# Patient Record
Sex: MALE | Race: WHITE | NOT HISPANIC OR LATINO
[De-identification: names, ages, dates, MRNs, and addresses within clinical notes are randomized per-mention and may not be internally consistent; named-entity substitution may affect disease eponyms.]

---

## 2021-06-27 PROBLEM — Z00.00 ENCOUNTER FOR PREVENTIVE HEALTH EXAMINATION: Status: ACTIVE | Noted: 2021-06-27

## 2021-07-01 ENCOUNTER — APPOINTMENT (OUTPATIENT)
Dept: NEUROSURGERY | Facility: CLINIC | Age: 26
End: 2021-07-01
Payer: COMMERCIAL

## 2021-07-01 VITALS
SYSTOLIC BLOOD PRESSURE: 104 MMHG | DIASTOLIC BLOOD PRESSURE: 70 MMHG | OXYGEN SATURATION: 98 % | RESPIRATION RATE: 12 BRPM | HEART RATE: 63 BPM | WEIGHT: 145 LBS | HEIGHT: 70 IN | TEMPERATURE: 97.3 F | BODY MASS INDEX: 20.76 KG/M2

## 2021-07-01 DIAGNOSIS — D33.3 BENIGN NEOPLASM OF CRANIAL NERVES: ICD-10-CM

## 2021-07-01 DIAGNOSIS — Z72.0 TOBACCO USE: ICD-10-CM

## 2021-07-01 DIAGNOSIS — G43.909 MIGRAINE, UNSPECIFIED, NOT INTRACTABLE, W/OUT STATUS MIGRAINOSUS: ICD-10-CM

## 2021-07-01 DIAGNOSIS — F41.9 ANXIETY DISORDER, UNSPECIFIED: ICD-10-CM

## 2021-07-01 PROCEDURE — 99204 OFFICE O/P NEW MOD 45 MIN: CPT

## 2021-07-01 PROCEDURE — 99072 ADDL SUPL MATRL&STAF TM PHE: CPT

## 2021-07-01 PROCEDURE — 99203 OFFICE O/P NEW LOW 30 MIN: CPT

## 2021-07-01 NOTE — ASSESSMENT
[FreeTextEntry1] : MRI brain with and without contrast from 5/24/21 reviewed by Dr. Severino with patient.\par Discussed various treatment options which include surgical resection, Gamma Knife Radiosurgery and continued imaging surveillance. Risks, benefits and alternatives to surgery were discussed in detail with patient and patient's family.\par Risks, benefits and alternatives to surgical resection discussed in detail. Risks include but are not limited to hearing loss, facial paralysis, bleeding, infection. \par Risks, alternatives and benefits to Gamma Knife Radiosurgery discussed. Risks include but are not limited to cerebral edema, radionecrosis, seizures, continued tumor growth. Patient understands and wishes to proceed.\par \par Explained procedure to patient in detail including head frame placement, new MRI the day of procedure, radiation treatment, and post-procedure care. \par Radiation treatment will take place at [address] [place] [address]. Directions and contact information provided to patient.\par Education packet regarding Gamma Knife Radiosurgery provided.\par Multiple questions answered to patient's satisfaction.\par \par All questions answered to patient and patient's satisfaction.\par \par Patient and patient's family verbalize agreement and understanding with plan of care.\par \par I, Dr. Severino, personally performed the evaluation and management (E/M) services for this new patient.  That E/M includes conducting the initial examination, assessing all conditions, and establishing the plan of care.  Today, my ACP, Melissa Garcia, was here to observe my evaluation and management services for this patient to be followed going forward.\par \par \par \par \par

## 2021-07-01 NOTE — REASON FOR VISIT
[Consultation] : a consultation visit [Referred By: _________] : Patient was referred by MK [FreeTextEntry1] : Right Acoustic Neuroma

## 2021-07-01 NOTE — HISTORY OF PRESENT ILLNESS
[de-identified] : 25 year old man with past medical history migraines and anxiety referred by Dr. Rg Montes for neurosurgical evaluation of acoustic neuroma.\par \par The patient is here with his mother for the visit.\par \par He states he has suffered from bilateral tinnitus (RIGHT greater than LEFT) for several years. About two months ago, he developed right sided headaches, most severe in the RIGHT temple region. He underwent MRI Brain with and without contrast which demonstrated a 2.3 cm CPA mass, consistent with acoustic neuroma.\par \par He saw ENT and had audiology testing done which was normal but did demonstrate worse hearing loss on the RIGHT.\par \par Denies facial weakness. Denies numbness/tingling.\par \par Reports occasional dizziness, vertigo and imbalance. Denies falls.

## 2021-07-01 NOTE — DATA REVIEWED
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

## 2021-07-01 NOTE — REASON FOR VISIT
[New Patient Visit] : a new patient visit [Referred By: _________] : Patient was referred by MK [Parent] : parent

## 2021-07-01 NOTE — PHYSICAL EXAM
[General Appearance - Alert] : alert [General Appearance - In No Acute Distress] : in no acute distress [Oriented To Time, Place, And Person] : oriented to person, place, and time [Cranial Nerves Optic (II)] : visual acuity intact bilaterally,  pupils equal round and reactive to light [Cranial Nerves Oculomotor (III)] : extraocular motion intact [Cranial Nerves Trigeminal (V)] : facial sensation intact symmetrically [Cranial Nerves Facial (VII)] : face symmetrical [Cranial Nerves Vestibulocochlear (VIII)] : hearing was intact bilaterally [Cranial Nerves Glossopharyngeal (IX)] : tongue and palate midline [Cranial Nerves Hypoglossal (XII)] : there was no tongue deviation with protrusion [Cranial Nerves Accessory (XI - Cranial And Spinal)] : head turning and shoulder shrug symmetric [Motor Tone] : muscle tone was normal in all four extremities [Motor Strength] : muscle strength was normal in all four extremities [Sclera] : the sclera and conjunctiva were normal [Outer Ear] : the ears and nose were normal in appearance [Neck Appearance] : the appearance of the neck was normal [Abnormal Walk] : normal gait [Skin Color & Pigmentation] : normal skin color and pigmentation

## 2021-07-09 NOTE — ASSESSMENT
[FreeTextEntry1] : PLAN:\par \par Reviewed images with family and patient\par Recommend to follow up RT therapy  will follow up with Dr Severino\par If does  not want RT therapy can re image in 3 months\par Continue current medical regime\par \par \par \par \par \par \par I, Dr. Montes, personally performed the evaluation and management (E/M) services for this new patient. That E/M includes conducting the initial examination, assessing all conditions, and establishing the plan of care. Today, my ACP, Mckenna Hernandez, was here to observe my evaluation and management services for this patient to be followed going forward.\par \par

## 2021-07-09 NOTE — PLAN
[FreeTextEntry1] : PLAN\par \par \par \par \par \par I, Dr. Montes, personally performed the evaluation and management (E/M) services for this new patient. That E/M includes conducting the initial examination, assessing all conditions, and establishing the plan of care. Today, my ACP, Mckenna Hernandez, was here to observe my evaluation and management services for this patient to be followed going forward.\par \par \par \par

## 2021-07-09 NOTE — HISTORY OF PRESENT ILLNESS
[de-identified] : 26 yo right handed male  noticed about 2 months ago of daily headaches and bilateral ear tinnitus left ear worse than right.  No decrease in hearing no ataxic gait, no numbness in face\par PMH denies\par PSH Denies

## 2021-07-09 NOTE — PHYSICAL EXAM
[General Appearance - In No Acute Distress] : in no acute distress [General Appearance - Alert] : alert [Impaired Insight] : insight and judgment were intact [Oriented To Time, Place, And Person] : oriented to person, place, and time [Affect] : the affect was normal [Person] : oriented to person [Place] : oriented to place [Time] : oriented to time [Short Term Intact] : short term memory intact [Remote Intact] : remote memory intact [Span Intact] : the attention span was normal [Concentration Intact] : normal concentrating ability [Fluency] : fluency intact [Comprehension] : comprehension intact [Current Events] : adequate knowledge of current events [Past History] : adequate knowledge of personal past history [Vocabulary] : adequate range of vocabulary [Cranial Nerves Optic (II)] : visual acuity intact bilaterally,  pupils equal round and reactive to light [Cranial Nerves Trigeminal (V)] : facial sensation intact symmetrically [Cranial Nerves Oculomotor (III)] : extraocular motion intact [Cranial Nerves Facial (VII)] : face symmetrical [Cranial Nerves Vestibulocochlear (VIII)] : hearing was intact bilaterally [Cranial Nerves Glossopharyngeal (IX)] : tongue and palate midline [Cranial Nerves Accessory (XI - Cranial And Spinal)] : head turning and shoulder shrug symmetric [Cranial Nerves Hypoglossal (XII)] : there was no tongue deviation with protrusion [Motor Strength] : muscle strength was normal in all four extremities [No Muscle Atrophy] : normal bulk in all four extremities [Sensation Tactile Decrease] : light touch was intact [Balance] : balance was intact [2+] : Patella left 2+ [No Visual Abnormalities] : no visible abnormailities [No Tenderness to Palpation] : no spine tenderness on palpation [Full ROM] : full ROM [No Pain with ROM] : no pain with motion in any direction [Normal] : normal [Intact] : all reflexes within normal limits bilaterally [Sclera] : the sclera and conjunctiva were normal [PERRL With Normal Accommodation] : pupils were equal in size, round, reactive to light, with normal accommodation [Extraocular Movements] : extraocular movements were intact [Outer Ear] : the ears and nose were normal in appearance [Oropharynx] : the oropharynx was normal [Neck Appearance] : the appearance of the neck was normal [Neck Cervical Mass (___cm)] : no neck mass was observed [Jugular Venous Distention Increased] : there was no jugular-venous distention [Thyroid Diffuse Enlargement] : the thyroid was not enlarged [Thyroid Nodule] : there were no palpable thyroid nodules [Auscultation Breath Sounds / Voice Sounds] : lungs were clear to auscultation bilaterally [Heart Rate And Rhythm] : heart rate was normal and rhythm regular [Heart Sounds] : normal S1 and S2 [Heart Sounds Gallop] : no gallops [Murmurs] : no murmurs [Heart Sounds Pericardial Friction Rub] : no pericardial rub [Full Pulse] : the pedal pulses are present [Edema] : there was no peripheral edema [Bowel Sounds] : normal bowel sounds [Abdomen Soft] : soft [Abdomen Tenderness] : non-tender [] : no hepato-splenomegaly [Abdomen Mass (___ Cm)] : no abdominal mass palpated [Abnormal Walk] : normal gait [Nail Clubbing] : no clubbing  or cyanosis of the fingernails [Motor Tone] : muscle strength and tone were normal [Musculoskeletal - Swelling] : no joint swelling seen [Past-pointing] : there was no past-pointing [Tremor] : no tremor present [L'Hermitte's] : neck flexion did not produce tingling down the spine/arms [Spurling's - Opposite Side] : Negative Spurling's on opposite side [Spurling's Same Side] : Negative Spurling's on same side [Straight-Leg Raise Test - Left] : straight leg raise of the left leg was negative [Straight-Leg Raise Test - Right] : straight leg raise  of the right leg was negative [FreeTextEntry1] : c/o bialteral tinnitus and headaches

## 2021-07-09 NOTE — ADDENDUM
[FreeTextEntry1] : PATIENT:	Andrew Lew 				\par 	\par \par Thursday, July 1, 2021\par \par I saw Ian in the office today.  He is a 25-year-old otherwise healthy male who was found this past May to harbor a nearly 2.5 cm vestibular schwannoma after workup for severe sudden headache.  Andrew denies any hearing loss or facial weakness and now comes for neurosurgical evaluation.\par \par Mr. Lew has no other major medical problems.  He does have anxiety and migraine.  His current medications include Advil.  He drinks occasionally and smokes approximately once every \par 6 months.  He is allergic to penicillin and almonds.  He had a prior appendectomy but no major surgery.  With the exception of his anxiety, he really has no other major medical history.  He describes some dizziness, vertigo, cluster and tension headache but no gross neurological deficit with the exception of some tinnitus.  He has no cardiovascular, respiratory, or GI complaints.\par \par Neurologically, Andrew is intact.  There is no gross facial weakness, and he does not have any cerebellar signs.\par \par I had the opportunity to review his brain MRI which shows an approximately 2-2.5 cm tumor that avidly enhances with gadolinium consistent with a vestibular schwannoma in the right CP angle.  There is modest brainstem compression.  \par \par I had a long discussion with Mr. Lew and his mother.  I do think treatment is indicated given his age and the size of the tumor and have recommended consideration of Gamma Knife versus open surgical resection.  We reviewed the risks, benefits, and alternatives to both, and I will be having him meet with my partner, Omari Severino, to help them make a decision regarding one versus the other.\par \par I look forward to hearing of his decision and would hope to be able to take care of him in the future.\par \par \par \par Rg Montes MD\par \par DL/[facility] #[id number]\par \par \par \par

## 2021-07-09 NOTE — SOCIAL HISTORY
[Yes - full time] : Yes - full time [FreeTextEntry1] : Works for [employer]  Does not smoke or etoh abuse\par Does not smoke or abuse etoh